# Patient Record
Sex: FEMALE | Race: WHITE | NOT HISPANIC OR LATINO | Employment: OTHER | URBAN - NONMETROPOLITAN AREA
[De-identification: names, ages, dates, MRNs, and addresses within clinical notes are randomized per-mention and may not be internally consistent; named-entity substitution may affect disease eponyms.]

---

## 2017-05-09 ENCOUNTER — HOSPITAL ENCOUNTER (OUTPATIENT)
Dept: PULMONOLOGY | Facility: HOSPITAL | Age: 62
Discharge: HOME | End: 2017-05-09

## 2017-05-09 DIAGNOSIS — J44.9 CHRONIC OBSTRUCTIVE PULMONARY DISEASE, UNSPECIFIED COPD TYPE (HCC): Primary | ICD-10-CM

## 2017-05-11 ENCOUNTER — HOSPITAL ENCOUNTER (OUTPATIENT)
Dept: PULMONOLOGY | Facility: HOSPITAL | Age: 62
Discharge: HOME | End: 2017-05-11
Admitting: INTERNAL MEDICINE

## 2017-05-11 VITALS — DIASTOLIC BLOOD PRESSURE: 47 MMHG | SYSTOLIC BLOOD PRESSURE: 106 MMHG | OXYGEN SATURATION: 88 % | HEART RATE: 91 BPM

## 2017-05-11 DIAGNOSIS — J44.9 CHRONIC OBSTRUCTIVE PULMONARY DISEASE, UNSPECIFIED COPD TYPE (HCC): Primary | ICD-10-CM

## 2017-05-11 PROCEDURE — G0238 OTH RESP PROC, INDIV: HCPCS

## 2017-05-11 RX ORDER — FLUTICASONE PROPIONATE 50 MCG
2 SPRAY, SUSPENSION (ML) NASAL DAILY
COMMUNITY

## 2017-05-11 RX ORDER — CLOPIDOGREL BISULFATE 75 MG/1
75 TABLET ORAL DAILY
COMMUNITY

## 2017-05-11 RX ORDER — LOSARTAN POTASSIUM 25 MG/1
25 TABLET ORAL DAILY
COMMUNITY
End: 2017-06-30 | Stop reason: SDUPTHER

## 2017-05-11 RX ORDER — POTASSIUM CHLORIDE 20 MEQ/1
20 TABLET, EXTENDED RELEASE ORAL DAILY
COMMUNITY

## 2017-05-11 RX ORDER — SPIRONOLACTONE 25 MG/1
25 TABLET ORAL DAILY
COMMUNITY

## 2017-05-16 ENCOUNTER — TRANSCRIBE ORDERS (OUTPATIENT)
Dept: RESPIRATORY THERAPY | Facility: HOSPITAL | Age: 62
End: 2017-05-16

## 2017-05-16 DIAGNOSIS — J44.9 CHRONIC OBSTRUCTIVE PULMONARY DISEASE, UNSPECIFIED COPD TYPE (HCC): Primary | ICD-10-CM

## 2017-05-18 ENCOUNTER — HOSPITAL ENCOUNTER (OUTPATIENT)
Dept: PULMONOLOGY | Facility: HOSPITAL | Age: 62
Discharge: HOME | End: 2017-05-18
Admitting: INTERNAL MEDICINE

## 2017-05-18 VITALS — SYSTOLIC BLOOD PRESSURE: 152 MMHG | OXYGEN SATURATION: 90 % | DIASTOLIC BLOOD PRESSURE: 80 MMHG | HEART RATE: 96 BPM

## 2017-05-18 DIAGNOSIS — J44.9 CHRONIC OBSTRUCTIVE PULMONARY DISEASE, UNSPECIFIED COPD TYPE (HCC): Primary | ICD-10-CM

## 2017-05-18 PROCEDURE — G0238 OTH RESP PROC, INDIV: HCPCS

## 2017-06-27 ENCOUNTER — HOSPITAL ENCOUNTER (OUTPATIENT)
Dept: PULMONOLOGY | Facility: HOSPITAL | Age: 62
Discharge: HOME | End: 2017-06-27
Admitting: INTERNAL MEDICINE

## 2017-06-27 VITALS — OXYGEN SATURATION: 86 % | HEART RATE: 95 BPM | DIASTOLIC BLOOD PRESSURE: 62 MMHG | SYSTOLIC BLOOD PRESSURE: 137 MMHG

## 2017-06-27 DIAGNOSIS — J44.9 CHRONIC OBSTRUCTIVE PULMONARY DISEASE, UNSPECIFIED COPD TYPE (HCC): Primary | ICD-10-CM

## 2017-06-27 PROCEDURE — G0238 OTH RESP PROC, INDIV: HCPCS

## 2017-06-30 RX ORDER — PRAVASTATIN SODIUM 40 MG
TABLET ORAL
Qty: 90 TABLET | Refills: 0 | OUTPATIENT
Start: 2017-06-30

## 2017-06-30 RX ORDER — LOSARTAN POTASSIUM 25 MG/1
TABLET ORAL
Qty: 90 TABLET | Refills: 0 | Status: SHIPPED | OUTPATIENT
Start: 2017-06-30 | End: 2017-10-23 | Stop reason: SDUPTHER

## 2017-07-03 ENCOUNTER — LAB (OUTPATIENT)
Dept: LAB | Facility: HOSPITAL | Age: 62
End: 2017-07-03

## 2017-07-03 DIAGNOSIS — I10 ESSENTIAL HYPERTENSION: ICD-10-CM

## 2017-07-03 DIAGNOSIS — E11.42 TYPE 2 DIABETES MELLITUS WITH DIABETIC POLYNEUROPATHY, WITH LONG-TERM CURRENT USE OF INSULIN (HCC): Primary | ICD-10-CM

## 2017-07-03 DIAGNOSIS — E11.42 DIABETIC POLYNEUROPATHY ASSOCIATED WITH TYPE 2 DIABETES MELLITUS (HCC): ICD-10-CM

## 2017-07-03 DIAGNOSIS — Z79.4 TYPE 2 DIABETES MELLITUS WITH DIABETIC POLYNEUROPATHY, WITH LONG-TERM CURRENT USE OF INSULIN (HCC): Primary | ICD-10-CM

## 2017-07-03 DIAGNOSIS — E78.5 HYPERLIPIDEMIA, UNSPECIFIED HYPERLIPIDEMIA TYPE: ICD-10-CM

## 2017-07-03 LAB
ALBUMIN SERPL-MCNC: 3.9 G/DL (ref 3.4–4.8)
ALBUMIN/GLOB SERPL: 0.9 G/DL (ref 1.1–1.8)
ALP SERPL-CCNC: 103 U/L (ref 38–126)
ALT SERPL W P-5'-P-CCNC: 20 U/L (ref 9–52)
ANION GAP SERPL CALCULATED.3IONS-SCNC: 13 MMOL/L (ref 5–15)
AST SERPL-CCNC: 69 U/L (ref 14–36)
BILIRUB SERPL-MCNC: 0.8 MG/DL (ref 0.2–1.3)
BUN BLD-MCNC: 14 MG/DL (ref 7–21)
BUN/CREAT SERPL: 17.5 (ref 7–25)
CALCIUM SPEC-SCNC: 8.9 MG/DL (ref 8.4–10.2)
CHLORIDE SERPL-SCNC: 102 MMOL/L (ref 95–110)
CO2 SERPL-SCNC: 23 MMOL/L (ref 22–31)
CREAT BLD-MCNC: 0.8 MG/DL (ref 0.5–1)
GFR SERPL CREATININE-BSD FRML MDRD: 73 ML/MIN/1.73 (ref 45–104)
GLOBULIN UR ELPH-MCNC: 4.2 GM/DL (ref 2.3–3.5)
GLUCOSE BLD-MCNC: 40 MG/DL (ref 60–100)
POTASSIUM BLD-SCNC: 3.2 MMOL/L (ref 3.5–5.1)
PROT SERPL-MCNC: 8.1 G/DL (ref 6.3–8.6)
SODIUM BLD-SCNC: 138 MMOL/L (ref 137–145)

## 2017-07-03 PROCEDURE — 82607 VITAMIN B-12: CPT | Performed by: INTERNAL MEDICINE

## 2017-07-03 PROCEDURE — 80053 COMPREHEN METABOLIC PANEL: CPT | Performed by: INTERNAL MEDICINE

## 2017-07-03 PROCEDURE — 80061 LIPID PANEL: CPT | Performed by: INTERNAL MEDICINE

## 2017-07-03 PROCEDURE — 36415 COLL VENOUS BLD VENIPUNCTURE: CPT | Performed by: INTERNAL MEDICINE

## 2017-07-03 PROCEDURE — 84443 ASSAY THYROID STIM HORMONE: CPT | Performed by: INTERNAL MEDICINE

## 2017-07-03 PROCEDURE — 85025 COMPLETE CBC W/AUTO DIFF WBC: CPT | Performed by: INTERNAL MEDICINE

## 2017-07-03 PROCEDURE — 82306 VITAMIN D 25 HYDROXY: CPT | Performed by: INTERNAL MEDICINE

## 2017-07-03 PROCEDURE — 82570 ASSAY OF URINE CREATININE: CPT | Performed by: INTERNAL MEDICINE

## 2017-07-03 PROCEDURE — 83036 HEMOGLOBIN GLYCOSYLATED A1C: CPT | Performed by: INTERNAL MEDICINE

## 2017-07-03 PROCEDURE — 84156 ASSAY OF PROTEIN URINE: CPT | Performed by: INTERNAL MEDICINE

## 2017-07-03 PROCEDURE — 82043 UR ALBUMIN QUANTITATIVE: CPT | Performed by: INTERNAL MEDICINE

## 2017-07-06 RX ORDER — PRAVASTATIN SODIUM 40 MG
40 TABLET ORAL DAILY
Qty: 30 TABLET | Refills: 0 | Status: SHIPPED | OUTPATIENT
Start: 2017-07-06 | End: 2017-10-23 | Stop reason: SDUPTHER

## 2017-07-11 ENCOUNTER — HOSPITAL ENCOUNTER (OUTPATIENT)
Dept: PULMONOLOGY | Facility: HOSPITAL | Age: 62
Discharge: HOME | End: 2017-07-11
Admitting: INTERNAL MEDICINE

## 2017-07-11 VITALS — DIASTOLIC BLOOD PRESSURE: 46 MMHG | HEART RATE: 98 BPM | SYSTOLIC BLOOD PRESSURE: 96 MMHG | OXYGEN SATURATION: 90 %

## 2017-07-11 DIAGNOSIS — J44.9 CHRONIC OBSTRUCTIVE PULMONARY DISEASE, UNSPECIFIED COPD TYPE (HCC): Primary | ICD-10-CM

## 2017-07-11 PROCEDURE — G0238 OTH RESP PROC, INDIV: HCPCS

## 2017-07-27 ENCOUNTER — APPOINTMENT (OUTPATIENT)
Dept: PULMONOLOGY | Facility: HOSPITAL | Age: 62
End: 2017-07-27

## 2017-09-27 DIAGNOSIS — E55.9 VITAMIN D DEFICIENCY: ICD-10-CM

## 2017-09-27 DIAGNOSIS — E11.42 TYPE 2 DIABETES MELLITUS WITH DIABETIC POLYNEUROPATHY, WITH LONG-TERM CURRENT USE OF INSULIN (HCC): Primary | ICD-10-CM

## 2017-09-27 DIAGNOSIS — Z79.4 TYPE 2 DIABETES MELLITUS WITH DIABETIC POLYNEUROPATHY, WITH LONG-TERM CURRENT USE OF INSULIN (HCC): Primary | ICD-10-CM

## 2017-09-29 ENCOUNTER — APPOINTMENT (OUTPATIENT)
Dept: LAB | Facility: HOSPITAL | Age: 62
End: 2017-09-29

## 2017-09-29 LAB
25(OH)D3 SERPL-MCNC: 51.3 NG/ML (ref 30–100)
ALBUMIN SERPL-MCNC: 3.9 G/DL (ref 3.4–4.8)
ALBUMIN/GLOB SERPL: 1 G/DL (ref 1.1–1.8)
ALP SERPL-CCNC: 92 U/L (ref 38–126)
ALT SERPL W P-5'-P-CCNC: 27 U/L (ref 9–52)
ANION GAP SERPL CALCULATED.3IONS-SCNC: 10 MMOL/L (ref 5–15)
ARTICHOKE IGE QN: 114 MG/DL (ref 1–129)
AST SERPL-CCNC: 41 U/L (ref 14–36)
BASOPHILS # BLD AUTO: 0.04 10*3/MM3 (ref 0–0.2)
BASOPHILS NFR BLD AUTO: 0.4 % (ref 0–2)
BILIRUB SERPL-MCNC: 0.6 MG/DL (ref 0.2–1.3)
BUN BLD-MCNC: 14 MG/DL (ref 7–21)
BUN/CREAT SERPL: 18.4 (ref 7–25)
CALCIUM SPEC-SCNC: 8.9 MG/DL (ref 8.4–10.2)
CHLORIDE SERPL-SCNC: 93 MMOL/L (ref 95–110)
CHOLEST SERPL-MCNC: 194 MG/DL (ref 0–199)
CO2 SERPL-SCNC: 36 MMOL/L (ref 22–31)
CREAT BLD-MCNC: 0.76 MG/DL (ref 0.5–1)
DEPRECATED RDW RBC AUTO: 43.3 FL (ref 36.4–46.3)
EOSINOPHIL # BLD AUTO: 0.16 10*3/MM3 (ref 0–0.7)
EOSINOPHIL NFR BLD AUTO: 1.4 % (ref 0–7)
ERYTHROCYTE [DISTWIDTH] IN BLOOD BY AUTOMATED COUNT: 13 % (ref 11.5–14.5)
GFR SERPL CREATININE-BSD FRML MDRD: 77 ML/MIN/1.73 (ref 45–104)
GLOBULIN UR ELPH-MCNC: 4 GM/DL (ref 2.3–3.5)
GLUCOSE BLD-MCNC: 157 MG/DL (ref 60–100)
HBA1C MFR BLD: 8.3 % (ref 4–5.6)
HCT VFR BLD AUTO: 39.8 % (ref 35–45)
HDLC SERPL-MCNC: 31 MG/DL (ref 60–200)
HGB BLD-MCNC: 13.7 G/DL (ref 12–15.5)
IMM GRANULOCYTES # BLD: 0.04 10*3/MM3 (ref 0–0.02)
IMM GRANULOCYTES NFR BLD: 0.4 % (ref 0–0.5)
LDLC/HDLC SERPL: 3.66 {RATIO} (ref 0–3.22)
LYMPHOCYTES # BLD AUTO: 2.82 10*3/MM3 (ref 0.6–4.2)
LYMPHOCYTES NFR BLD AUTO: 25.4 % (ref 10–50)
MCH RBC QN AUTO: 31.4 PG (ref 26.5–34)
MCHC RBC AUTO-ENTMCNC: 34.4 G/DL (ref 31.4–36)
MCV RBC AUTO: 91.1 FL (ref 80–98)
MONOCYTES # BLD AUTO: 0.5 10*3/MM3 (ref 0–0.9)
MONOCYTES NFR BLD AUTO: 4.5 % (ref 0–12)
NEUTROPHILS # BLD AUTO: 7.54 10*3/MM3 (ref 2–8.6)
NEUTROPHILS NFR BLD AUTO: 67.9 % (ref 37–80)
PLATELET # BLD AUTO: 269 10*3/MM3 (ref 150–450)
PMV BLD AUTO: 10.4 FL (ref 8–12)
POTASSIUM BLD-SCNC: 3.3 MMOL/L (ref 3.5–5.1)
PROT SERPL-MCNC: 7.9 G/DL (ref 6.3–8.6)
RBC # BLD AUTO: 4.37 10*6/MM3 (ref 3.77–5.16)
SODIUM BLD-SCNC: 139 MMOL/L (ref 137–145)
TRIGL SERPL-MCNC: 247 MG/DL (ref 20–199)
TSH SERPL DL<=0.05 MIU/L-ACNC: 1.88 MIU/ML (ref 0.46–4.68)
VIT B12 BLD-MCNC: 750 PG/ML (ref 239–931)
WBC NRBC COR # BLD: 11.1 10*3/MM3 (ref 3.2–9.8)

## 2017-09-29 PROCEDURE — 82607 VITAMIN B-12: CPT | Performed by: INTERNAL MEDICINE

## 2017-09-29 PROCEDURE — 80053 COMPREHEN METABOLIC PANEL: CPT | Performed by: INTERNAL MEDICINE

## 2017-09-29 PROCEDURE — 36415 COLL VENOUS BLD VENIPUNCTURE: CPT | Performed by: INTERNAL MEDICINE

## 2017-09-29 PROCEDURE — 83036 HEMOGLOBIN GLYCOSYLATED A1C: CPT | Performed by: INTERNAL MEDICINE

## 2017-09-29 PROCEDURE — 85025 COMPLETE CBC W/AUTO DIFF WBC: CPT | Performed by: INTERNAL MEDICINE

## 2017-09-29 PROCEDURE — 84443 ASSAY THYROID STIM HORMONE: CPT | Performed by: INTERNAL MEDICINE

## 2017-09-29 PROCEDURE — 80061 LIPID PANEL: CPT | Performed by: INTERNAL MEDICINE

## 2017-09-29 PROCEDURE — 82306 VITAMIN D 25 HYDROXY: CPT | Performed by: INTERNAL MEDICINE

## 2017-10-02 ENCOUNTER — HOSPITAL ENCOUNTER (OUTPATIENT)
Dept: PULMONOLOGY | Facility: HOSPITAL | Age: 62
Discharge: HOME | End: 2017-10-02
Attending: INTERNAL MEDICINE | Admitting: INTERNAL MEDICINE

## 2017-10-02 DIAGNOSIS — J44.9 CHRONIC OBSTRUCTIVE PULMONARY DISEASE, UNSPECIFIED COPD TYPE (HCC): ICD-10-CM

## 2017-10-02 PROCEDURE — 94010 BREATHING CAPACITY TEST: CPT | Performed by: INTERNAL MEDICINE

## 2017-10-02 PROCEDURE — 94010 BREATHING CAPACITY TEST: CPT

## 2017-10-04 ENCOUNTER — OFFICE VISIT (OUTPATIENT)
Dept: ENDOCRINOLOGY | Facility: CLINIC | Age: 62
End: 2017-10-04

## 2017-10-04 VITALS
HEART RATE: 96 BPM | BODY MASS INDEX: 35.59 KG/M2 | WEIGHT: 188.5 LBS | HEIGHT: 61 IN | DIASTOLIC BLOOD PRESSURE: 80 MMHG | SYSTOLIC BLOOD PRESSURE: 124 MMHG

## 2017-10-04 DIAGNOSIS — E55.9 VITAMIN D DEFICIENCY: ICD-10-CM

## 2017-10-04 DIAGNOSIS — E78.5 HYPERLIPIDEMIA, UNSPECIFIED HYPERLIPIDEMIA TYPE: ICD-10-CM

## 2017-10-04 DIAGNOSIS — E11.42 TYPE 2 DIABETES MELLITUS WITH DIABETIC POLYNEUROPATHY, WITH LONG-TERM CURRENT USE OF INSULIN (HCC): Primary | ICD-10-CM

## 2017-10-04 DIAGNOSIS — Z79.4 TYPE 2 DIABETES MELLITUS WITH DIABETIC POLYNEUROPATHY, WITH LONG-TERM CURRENT USE OF INSULIN (HCC): Primary | ICD-10-CM

## 2017-10-04 DIAGNOSIS — I10 ESSENTIAL HYPERTENSION: ICD-10-CM

## 2017-10-04 LAB
GLUCOSE BLDC GLUCOMTR-MCNC: 49 MG/DL (ref 70–130)
GLUCOSE BLDC GLUCOMTR-MCNC: 94 MG/DL (ref 70–130)

## 2017-10-04 PROCEDURE — 99214 OFFICE O/P EST MOD 30 MIN: CPT | Performed by: NURSE PRACTITIONER

## 2017-10-04 PROCEDURE — 82962 GLUCOSE BLOOD TEST: CPT | Performed by: NURSE PRACTITIONER

## 2017-10-04 NOTE — PROGRESS NOTES
Subjective    Sulma Jacques is a 61 y.o. female. she is here today for follow-up.    History of Present Illness       History of Present Illness  Duration/Timing:  Diabetes mellitus type 2  duration, since 1998     timing, constant     quality, uncontrolled     -----------------------     Recent admission to hospital for CHF   Discharged one week ago   States blood sugar was controlled while in hospital because she could not drink regular pepsi  Severity (Complications/Hospitalizations)  Secondary Macrovascular Complications:  No CAD, No CVA, No PAD  Carotid Artery Disease  Secondary Microvascular Complications:  No Diabetic Nephropathy, No proteinuria, No Diabetic Retinopathy, Diabetic Neuropathy     Context  Diabetes Regimen:  Insulin, Oral Medications, Last HgbA1c% 7. 9 from May 2016    Lab Results   Component Value Date    HGBA1C 8.3 (H) 09/29/2017       Blood Glucose Readings      Low in office of 49     Refers not checking  Exercise:  Does not exercise     Associated Signs/Symptoms  Hyperglycemic Symptoms:  Polyuria, Polydipsia, Polyphagia  Hypoglycemic Episodes:  Documented symptomatic hypoglycemia  Hypertriglyceridemia     June 2015 Tg 647     Tot Chol 181     HDL 26         The following portions of the patient's history were reviewed and updated as appropriate:   Past Medical History:   Diagnosis Date   • Acute gastritis    • Anxiety    • Atherosclerosis     arteries of the extremities with intermittent claudication   • Carotid artery stenosis    • Chronic hypoxemic respiratory failure     On oxygen supplementation @2L/mi n q 24 hours   • Chronic low back pain    • COPD (chronic obstructive pulmonary disease)     Mod-Severe   • Depression    • Diabetes mellitus     Type 2   • Dyspnea    • Edema    • Encephalitis    • Epigastric pain    • Esophagitis    • Gallstone ileus     status post laparoscopic cholecystectomy   • GERD (gastroesophageal reflux disease)    • Hx of colonic polyp    • Hyperlipidemia     • Hypertension    • Hypoxia     nocturnal and exertional on oxygen supplementation   • Insomnia    • Left upper quadrant pain    • Malaise and fatigue    • Migraine    • Neurogenic claudication    • Neuropathy    • Nicotine dependence    • Obstructive sleep apnea of adult     Mild RUBEN and moderate REM sleep associated RUBEN   • Postoperative visit     s/p L CEA 1/15/2015   • Primary fibromyalgia syndrome    • Restless legs    • Urinary system disease    • UTI (urinary tract infection)    • Vulvovaginitis      Past Surgical History:   Procedure Laterality Date   • CARDIAC CATHETERIZATION      Proximal circumflex artery towards the ostium with up to 60% stenosis diffusely calcified right coronary artery with 30-40% stenosis. Preserved LV systolic function with Ef of 55%   • CAROTID ENDARTERECTOMY      Left common and internal carotid endarterectomy with Hemashield patch angioplasty   •  SECTION      x2   • CHOLECYSTECTOMY      Laparoscopic cholecystectomy with operative cholangiogram. Gallstones   • HERNIA REPAIR      Multiple   • HYSTERECTOMY     • TRANSESOPHAGEAL ECHOCARDIOGRAM (AMANDA)      With color flow- Mild left atrial enlargemtn with mild CLVH. Evidence of mild septal wall hypokinesis. Decreased LV systolic function. Ef of 40-45%. MV intact. Tricuspid intact. Mild to moderate mitral regurg. mildtricuspid regurg, mild aortic insuff   • TUBAL ABDOMINAL LIGATION       Family History   Problem Relation Age of Onset   • Cancer Other    • Diabetes Other    • Thyroid disease Other      OB History     No data available        Current Outpatient Prescriptions   Medication Sig Dispense Refill   • albuterol (PROVENTIL HFA;VENTOLIN HFA) 108 (90 BASE) MCG/ACT inhaler Inhale 2 puffs every 4 (four) hours as needed for wheezing.     • albuterol (PROVENTIL) (2.5 MG/3ML) 0.083% nebulizer solution      • amitriptyline (ELAVIL) 25 MG tablet Take 25 mg by mouth every night.     • aspirin 325 MG tablet Take 325 mg by mouth  every night.     • B Complex-C-Folic Acid (NEPHROCAPS PO) Take 1 tablet by mouth daily.     • carvedilol (COREG) 6.25 MG tablet Take 3.125 mg by mouth 2 (two) times a day with meals.     • cetirizine (ZyrTEC) 10 MG tablet Take 10 mg by mouth daily.     • Cholecalciferol (VITAMIN D PO) Take 1 tablet by mouth daily.     • clopidogrel (PLAVIX) 75 MG tablet Take 75 mg by mouth Daily.     • Cranberry 450 MG tablet Take 1 tablet by mouth daily.     • dexlansoprazole (DEXILANT) 60 MG capsule Take 60 mg by mouth daily.     • docusate sodium (COLACE) 50 MG capsule Take 100 mg by mouth 2 (two) times a day.     • DULoxetine (CYMBALTA) 60 MG capsule Take 60 mg by mouth 2 (two) times a day.     • ferrous sulfate 325 (65 FE) MG tablet Take 325 mg by mouth 3 (three) times a day with meals.     • fluticasone (FLONASE) 50 MCG/ACT nasal spray 2 sprays into each nostril Daily.     • furosemide (LASIX) 40 MG tablet Take 40 mg by mouth 2 (two) times a day.     • Glucose Blood (BLOOD GLUCOSE TEST) strip Use 4 x daily, use any brand covered by insurance or same brand as before 360 each 3   • Insulin Glargine (TOUJEO SOLOSTAR) 300 UNIT/ML solution pen-injector Inject 75 Units under the skin Every Night. 15 pen 3   • Insulin Pen Needle (B-D UF III MINI PEN NEEDLES) 31G X 5 MM misc Use 4 times daily 360 each 3   • KRILL OIL OMEGA-3 PO Take 1 capsule by mouth daily.     • losartan (COZAAR) 25 MG tablet TAKE ONE TABLET BY MOUTH ONCE DAILY 90 tablet 0   • lysine acetate 500 MG tablet tablet Take 1 tablet by mouth every other day.     • magnesium oxide (MAGOX) 400 (241.3 MG) MG tablet tablet Take 1 tablet by mouth 2 (two) times a day. 60 tablet 2   • Multiple Vitamins-Minerals (MULTIVITAMIN ADULT PO) Take 1 tablet by mouth daily.     • niacin 500 MG tablet Take 500 mg by mouth 2 (two) times a day with meals. Wants caps     • nitroglycerin (NITROSTAT) 0.4 MG SL tablet      • NOVOLOG FLEXPEN 100 UNIT/ML solution pen-injector sc pen 25 units with  meals 24 pen 3   • polycarbophil (FIBERCON) 625 MG tablet Take 1,250 mg by mouth daily.     • potassium chloride (K-DUR,KLOR-CON) 20 MEQ CR tablet Take 20 mEq by mouth Daily.     • pravastatin (PRAVACHOL) 40 MG tablet Take 1 tablet by mouth Daily. 30 tablet 0   • pregabalin (LYRICA) 150 MG capsule Take 150 mg by mouth 2 (two) times a day.     • rOPINIRole (REQUIP) 3 MG tablet Take 3 mg by mouth every night.     • spironolactone (ALDACTONE) 25 MG tablet Take 25 mg by mouth Daily.     • Umeclidinium-Vilanterol (ANORO ELLIPTA) 62.5-25 MCG/INH aerosol powder  Inhale 1 puff daily.       No current facility-administered medications for this visit.      Allergies   Allergen Reactions   • Codeine Other (See Comments)     N/V   • Crestor [Rosuvastatin Calcium] Other (See Comments)     Muscle weakness   • Demerol [Meperidine] Other (See Comments)     Dorado   • Fish Oil Other (See Comments)     N/V   • Lipitor [Atorvastatin] Other (See Comments)     Flu like symptoms   • Lovaza [Omega-3-Acid Ethyl Esters] Swelling   • Metformin And Related Other (See Comments)     N/V   • Nateglinide Other (See Comments)     N/V   • Other Other (See Comments)     Staples, skin irritation   • Simvastatin Other (See Comments)     Flu like symptoms   • Sulfa Antibiotics Other (See Comments)     N/V   • Tricor [Fenofibrate] Other (See Comments)     N/V   • Wellbutrin [Bupropion] Other (See Comments)     N/V   • Betadine [Povidone Iodine] Rash     Social History     Social History   • Marital status:      Spouse name: N/A   • Number of children: N/A   • Years of education: N/A     Social History Main Topics   • Smoking status: Current Every Day Smoker   • Smokeless tobacco: None   • Alcohol use No   • Drug use: None   • Sexual activity: Not Asked     Other Topics Concern   • None     Social History Narrative       Review of Systems  Review of Systems   Constitutional: Negative for activity change, appetite change, diaphoresis and fatigue.  "  HENT: Negative for congestion, dental problem, drooling, facial swelling, sneezing, sore throat, tinnitus, trouble swallowing and voice change.    Eyes: Negative for photophobia, pain, discharge, redness, itching and visual disturbance.   Respiratory: Negative for apnea, cough, choking, chest tightness and shortness of breath.    Cardiovascular: Negative for chest pain, palpitations and leg swelling.   Gastrointestinal: Negative for abdominal distention, abdominal pain, constipation, diarrhea, nausea and vomiting.   Endocrine: Negative for cold intolerance, heat intolerance, polydipsia, polyphagia and polyuria.   Genitourinary: Negative for difficulty urinating, dysuria, frequency, hematuria and urgency.   Musculoskeletal: Negative for arthralgias, back pain, gait problem, joint swelling, myalgias, neck pain and neck stiffness.   Skin: Negative for color change, pallor, rash and wound.   Allergic/Immunologic: Negative for environmental allergies, food allergies and immunocompromised state.   Neurological: Negative for dizziness, tremors, facial asymmetry, weakness, light-headedness, numbness and headaches.   Hematological: Negative for adenopathy. Does not bruise/bleed easily.   Psychiatric/Behavioral: Negative for agitation, behavioral problems, confusion and sleep disturbance.        Objective    /80 (BP Location: Left arm, Patient Position: Sitting, Cuff Size: Adult)  Pulse 96  Ht 61\" (154.9 cm)  Wt 188 lb 8 oz (85.5 kg)  BMI 35.62 kg/m2  Physical Exam   Constitutional: She is oriented to person, place, and time. She appears well-developed and well-nourished. No distress.   HENT:   Head: Normocephalic and atraumatic.   Right Ear: External ear normal.   Left Ear: External ear normal.   Nose: Nose normal.   Eyes: Conjunctivae and EOM are normal. Pupils are equal, round, and reactive to light.   Neck: Normal range of motion. Neck supple. No tracheal deviation present. No thyromegaly present. "   Cardiovascular: Normal rate, regular rhythm and normal heart sounds.    No murmur heard.  Pulmonary/Chest: Effort normal and breath sounds normal. No respiratory distress. She has no wheezes.   Abdominal: Soft. Bowel sounds are normal. There is no tenderness. There is no rebound and no guarding.   Musculoskeletal: Normal range of motion. She exhibits no edema, tenderness or deformity.      During the foot exam she had a monofilament test performed (decreased sensation bilateral foot ).    Vascular Status -  Her exam exhibits right foot edema. Her exam exhibits left foot edema.   Skin Integrity  -  She has right foot onychomycosis, callous right foot and right heel is dry and cracked.She has left foot onychomycosis, callous left foot and left heel dry and cracked..  Neurological: She is alert and oriented to person, place, and time. No cranial nerve deficit.   Skin: Skin is warm and dry. No rash noted.   Psychiatric: She has a normal mood and affect. Her behavior is normal. Judgment and thought content normal.       Lab Review  Glucose   Date Value   09/29/2017 157 mg/dL (H)   07/03/2017 40 mg/dL (C)   10/30/2016 173 mg/dl (H)   10/29/2016 79 mg/dl   10/28/2016 146 mg/dl (H)     Sodium (mmol/L)   Date Value   09/29/2017 139   07/03/2017 138   10/30/2016 136 (L)   10/29/2016 138   10/28/2016 136 (L)     Potassium (mmol/L)   Date Value   09/29/2017 3.3 (L)   07/03/2017 3.2 (L)   10/30/2016 3.7   10/29/2016 3.4 (L)   10/28/2016 3.9     Chloride (mmol/L)   Date Value   09/29/2017 93 (L)   07/03/2017 102   10/30/2016 92 (L)   10/29/2016 95   10/28/2016 99     CO2 (mmol/L)   Date Value   09/29/2017 36.0 (H)   07/03/2017 23.0   10/30/2016 38 (H)   10/29/2016 38 (H)   10/28/2016 32 (H)     BUN   Date Value   09/29/2017 14 mg/dL   07/03/2017 14 mg/dL   10/30/2016 12 mg/dl   10/29/2016 10 mg/dl   10/28/2016 7 mg/dl     Creatinine   Date Value   09/29/2017 0.76 mg/dL   07/03/2017 0.80 mg/dL   10/30/2016 0.8 mg/dl   10/29/2016  0.8 mg/dl   10/28/2016 0.7 mg/dl     Hemoglobin A1C   Date Value   09/29/2017 8.3 % (H)   07/03/2017 8.39 % (H)   10/26/2016 7.2 %TotHgb (H)   05/03/2016 7.9 %TotHgb (H)   02/01/2016 7.3 %TotHgb (H)     Triglycerides   Date Value   09/29/2017 247 mg/dL (H)   07/03/2017 183 mg/dL   10/26/2016 143 mg/dl   05/03/2016 160 mg/dl   02/01/2016 400 mg/dl (H)       Assessment/Plan      1. Type 2 diabetes mellitus with diabetic polyneuropathy, with long-term current use of insulin    2. Hyperlipidemia, unspecified hyperlipidemia type    3. Essential hypertension    4. Vitamin D deficiency     .    Medications prescribed:  Outpatient Encounter Prescriptions as of 10/4/2017   Medication Sig Dispense Refill   • albuterol (PROVENTIL HFA;VENTOLIN HFA) 108 (90 BASE) MCG/ACT inhaler Inhale 2 puffs every 4 (four) hours as needed for wheezing.     • albuterol (PROVENTIL) (2.5 MG/3ML) 0.083% nebulizer solution      • amitriptyline (ELAVIL) 25 MG tablet Take 25 mg by mouth every night.     • aspirin 325 MG tablet Take 325 mg by mouth every night.     • B Complex-C-Folic Acid (NEPHROCAPS PO) Take 1 tablet by mouth daily.     • carvedilol (COREG) 6.25 MG tablet Take 3.125 mg by mouth 2 (two) times a day with meals.     • cetirizine (ZyrTEC) 10 MG tablet Take 10 mg by mouth daily.     • Cholecalciferol (VITAMIN D PO) Take 1 tablet by mouth daily.     • clopidogrel (PLAVIX) 75 MG tablet Take 75 mg by mouth Daily.     • Cranberry 450 MG tablet Take 1 tablet by mouth daily.     • dexlansoprazole (DEXILANT) 60 MG capsule Take 60 mg by mouth daily.     • docusate sodium (COLACE) 50 MG capsule Take 100 mg by mouth 2 (two) times a day.     • DULoxetine (CYMBALTA) 60 MG capsule Take 60 mg by mouth 2 (two) times a day.     • ferrous sulfate 325 (65 FE) MG tablet Take 325 mg by mouth 3 (three) times a day with meals.     • fluticasone (FLONASE) 50 MCG/ACT nasal spray 2 sprays into each nostril Daily.     • furosemide (LASIX) 40 MG tablet Take 40 mg  by mouth 2 (two) times a day.     • Glucose Blood (BLOOD GLUCOSE TEST) strip Use 4 x daily, use any brand covered by insurance or same brand as before 360 each 3   • Insulin Glargine (TOUJEO SOLOSTAR) 300 UNIT/ML solution pen-injector Inject 75 Units under the skin Every Night. 15 pen 3   • Insulin Pen Needle (B-D UF III MINI PEN NEEDLES) 31G X 5 MM misc Use 4 times daily 360 each 3   • KRILL OIL OMEGA-3 PO Take 1 capsule by mouth daily.     • losartan (COZAAR) 25 MG tablet TAKE ONE TABLET BY MOUTH ONCE DAILY 90 tablet 0   • lysine acetate 500 MG tablet tablet Take 1 tablet by mouth every other day.     • magnesium oxide (MAGOX) 400 (241.3 MG) MG tablet tablet Take 1 tablet by mouth 2 (two) times a day. 60 tablet 2   • Multiple Vitamins-Minerals (MULTIVITAMIN ADULT PO) Take 1 tablet by mouth daily.     • niacin 500 MG tablet Take 500 mg by mouth 2 (two) times a day with meals. Wants caps     • nitroglycerin (NITROSTAT) 0.4 MG SL tablet      • NOVOLOG FLEXPEN 100 UNIT/ML solution pen-injector sc pen 25 units with meals 24 pen 3   • polycarbophil (FIBERCON) 625 MG tablet Take 1,250 mg by mouth daily.     • potassium chloride (K-DUR,KLOR-CON) 20 MEQ CR tablet Take 20 mEq by mouth Daily.     • pravastatin (PRAVACHOL) 40 MG tablet Take 1 tablet by mouth Daily. 30 tablet 0   • pregabalin (LYRICA) 150 MG capsule Take 150 mg by mouth 2 (two) times a day.     • rOPINIRole (REQUIP) 3 MG tablet Take 3 mg by mouth every night.     • spironolactone (ALDACTONE) 25 MG tablet Take 25 mg by mouth Daily.     • Umeclidinium-Vilanterol (ANORO ELLIPTA) 62.5-25 MCG/INH aerosol powder  Inhale 1 puff daily.       No facility-administered encounter medications on file as of 10/4/2017.        Orders placed during this encounter include:  Orders Placed This Encounter   Procedures   • Iron Profile     Standing Status:   Future   • Comprehensive Metabolic Panel     Standing Status:   Future   • Hemoglobin A1c     Standing Status:   Future   •  Lipid Panel     Standing Status:   Future   • Vitamin D 25 Hydroxy     Standing Status:   Future   • Protein / Creatinine Ratio, Urine - Urine, Clean Catch     Standing Status:   Future   • Microalbumin / Creatinine Urine Ratio - Urine, Clean Catch     Standing Status:   Future   • POC Glucose Fingerstick   • POC Glucose Fingerstick   • CBC & Differential     Standing Status:   Future     Order Specific Question:   Manual Differential     Answer:   No       Glycemic management     Lab Results   Component Value Date    HGBA1C 8.3 (H) 09/29/2017        toujeo 56 units --- 70 units --- decrease to 68 units      compare your bedtime reading to your morning reading     if you go to bed between 80 to 150 , you should wake between 80 to 150      if dropping below 80 or dropping more than 50 points overnight - back off 5 units     if rising more than 50 points overnight - increase by 3 units every 3 days     =================     Januvia 100 mg daily, too expensive     =================     Novolog  1 units for every 15 grams of carbohydrate --increase to 2 units per 15 grams of CHO           Lipid Management      Total Cholesterol   Date Value Ref Range Status   09/29/2017 194 0 - 199 mg/dL Final     Triglycerides   Date Value Ref Range Status   09/29/2017 247 (H) 20 - 199 mg/dL Final     HDL Cholesterol   Date Value Ref Range Status   09/29/2017 31 (L) 60 - 200 mg/dL Final     LDL Cholesterol    Date Value Ref Range Status   09/29/2017 114 1 - 129 mg/dL Final             pravastatin at night-- not taking restart      niacin 500 mg , preceded by aspirin - not taking restart      high fructose corn syrup drinks raise triglycerides and sugar - stop pepsi     tried tricor and can't tolerate     can't tolerate fish oil but tolerate omega krill oil ( uncertain safety )     the only true safe fish oil is brand name ( lovaza or vascepa but couldn't handle )      address diet.                  Blood Pressure Management  on  losartan ,  dose is 25 mg daily      Cardiology started again mg ox and chlorK                 Lab Results   Component Value Date     GLUCOSE 173 (H) 10/30/2016     CALCIUM 9.2 10/30/2016      (L) 10/30/2016     K 3.7 10/30/2016     CO2 38 (H) 10/30/2016     CL 92 (L) 10/30/2016     BUN 12 10/30/2016     CREATININE 0.8 10/30/2016     ANIONGAP 6.0 10/30/2016         Microvascular Complication Monitoring:  Microalbuminuria, Date of last Microalbumin Assessment 02/11/2016, No Diabetic Retinopathy, Diabetic Neuropathy  Preventive Care:  Patient is smoking  states that will quit   Weight Related:  Obesity, Counseled on nutrition, Counseled on physical activity  Other Diabetes Related Aspects        Lab Results   Component Value Date     TSH 1.60 10/26/2016            Lab Results   Component Value Date     [id number] 05/03/2016                   4. Follow-up: Return in about 3 months (around 1/4/2018) for Recheck.

## 2017-10-10 ENCOUNTER — APPOINTMENT (OUTPATIENT)
Dept: PULMONOLOGY | Facility: HOSPITAL | Age: 62
End: 2017-10-10

## 2017-10-12 ENCOUNTER — APPOINTMENT (OUTPATIENT)
Dept: PULMONOLOGY | Facility: HOSPITAL | Age: 62
End: 2017-10-12

## 2017-10-17 ENCOUNTER — APPOINTMENT (OUTPATIENT)
Dept: PULMONOLOGY | Facility: HOSPITAL | Age: 62
End: 2017-10-17

## 2017-10-24 RX ORDER — LOSARTAN POTASSIUM 25 MG/1
TABLET ORAL
Qty: 90 TABLET | Refills: 0 | Status: SHIPPED | OUTPATIENT
Start: 2017-10-24

## 2017-10-24 RX ORDER — PRAVASTATIN SODIUM 40 MG
TABLET ORAL
Qty: 90 TABLET | Refills: 3 | Status: SHIPPED | OUTPATIENT
Start: 2017-10-24

## 2017-10-26 ENCOUNTER — APPOINTMENT (OUTPATIENT)
Dept: PULMONOLOGY | Facility: HOSPITAL | Age: 62
End: 2017-10-26

## 2017-10-31 ENCOUNTER — APPOINTMENT (OUTPATIENT)
Dept: PULMONOLOGY | Facility: HOSPITAL | Age: 62
End: 2017-10-31

## 2017-11-02 ENCOUNTER — APPOINTMENT (OUTPATIENT)
Dept: PULMONOLOGY | Facility: HOSPITAL | Age: 62
End: 2017-11-02

## 2017-11-07 ENCOUNTER — APPOINTMENT (OUTPATIENT)
Dept: PULMONOLOGY | Facility: HOSPITAL | Age: 62
End: 2017-11-07

## 2017-11-09 ENCOUNTER — APPOINTMENT (OUTPATIENT)
Dept: PULMONOLOGY | Facility: HOSPITAL | Age: 62
End: 2017-11-09

## 2017-11-14 ENCOUNTER — APPOINTMENT (OUTPATIENT)
Dept: PULMONOLOGY | Facility: HOSPITAL | Age: 62
End: 2017-11-14

## 2017-11-16 ENCOUNTER — APPOINTMENT (OUTPATIENT)
Dept: PULMONOLOGY | Facility: HOSPITAL | Age: 62
End: 2017-11-16

## 2017-11-21 ENCOUNTER — APPOINTMENT (OUTPATIENT)
Dept: PULMONOLOGY | Facility: HOSPITAL | Age: 62
End: 2017-11-21

## 2017-11-23 ENCOUNTER — APPOINTMENT (OUTPATIENT)
Dept: PULMONOLOGY | Facility: HOSPITAL | Age: 62
End: 2017-11-23

## 2017-11-28 ENCOUNTER — APPOINTMENT (OUTPATIENT)
Dept: PULMONOLOGY | Facility: HOSPITAL | Age: 62
End: 2017-11-28

## 2017-11-30 ENCOUNTER — APPOINTMENT (OUTPATIENT)
Dept: PULMONOLOGY | Facility: HOSPITAL | Age: 62
End: 2017-11-30

## 2017-12-05 ENCOUNTER — APPOINTMENT (OUTPATIENT)
Dept: PULMONOLOGY | Facility: HOSPITAL | Age: 62
End: 2017-12-05

## 2017-12-07 ENCOUNTER — APPOINTMENT (OUTPATIENT)
Dept: PULMONOLOGY | Facility: HOSPITAL | Age: 62
End: 2017-12-07

## 2017-12-12 ENCOUNTER — APPOINTMENT (OUTPATIENT)
Dept: PULMONOLOGY | Facility: HOSPITAL | Age: 62
End: 2017-12-12

## 2017-12-14 ENCOUNTER — APPOINTMENT (OUTPATIENT)
Dept: PULMONOLOGY | Facility: HOSPITAL | Age: 62
End: 2017-12-14

## 2017-12-19 ENCOUNTER — APPOINTMENT (OUTPATIENT)
Dept: PULMONOLOGY | Facility: HOSPITAL | Age: 62
End: 2017-12-19

## 2017-12-21 ENCOUNTER — APPOINTMENT (OUTPATIENT)
Dept: PULMONOLOGY | Facility: HOSPITAL | Age: 62
End: 2017-12-21

## 2017-12-26 ENCOUNTER — APPOINTMENT (OUTPATIENT)
Dept: PULMONOLOGY | Facility: HOSPITAL | Age: 62
End: 2017-12-26

## 2017-12-28 ENCOUNTER — APPOINTMENT (OUTPATIENT)
Dept: PULMONOLOGY | Facility: HOSPITAL | Age: 62
End: 2017-12-28

## 2018-01-02 ENCOUNTER — APPOINTMENT (OUTPATIENT)
Dept: PULMONOLOGY | Facility: HOSPITAL | Age: 63
End: 2018-01-02

## 2018-01-04 ENCOUNTER — APPOINTMENT (OUTPATIENT)
Dept: PULMONOLOGY | Facility: HOSPITAL | Age: 63
End: 2018-01-04

## 2018-01-09 ENCOUNTER — APPOINTMENT (OUTPATIENT)
Dept: PULMONOLOGY | Facility: HOSPITAL | Age: 63
End: 2018-01-09

## 2018-01-11 ENCOUNTER — APPOINTMENT (OUTPATIENT)
Dept: PULMONOLOGY | Facility: HOSPITAL | Age: 63
End: 2018-01-11

## 2018-01-16 ENCOUNTER — APPOINTMENT (OUTPATIENT)
Dept: PULMONOLOGY | Facility: HOSPITAL | Age: 63
End: 2018-01-16

## 2018-01-18 ENCOUNTER — APPOINTMENT (OUTPATIENT)
Dept: PULMONOLOGY | Facility: HOSPITAL | Age: 63
End: 2018-01-18

## 2018-01-23 ENCOUNTER — APPOINTMENT (OUTPATIENT)
Dept: PULMONOLOGY | Facility: HOSPITAL | Age: 63
End: 2018-01-23

## 2018-01-24 ENCOUNTER — OFFICE VISIT (OUTPATIENT)
Dept: ENDOCRINOLOGY | Facility: CLINIC | Age: 63
End: 2018-01-24

## 2018-01-24 VITALS
WEIGHT: 181 LBS | HEART RATE: 92 BPM | BODY MASS INDEX: 34.17 KG/M2 | HEIGHT: 61 IN | SYSTOLIC BLOOD PRESSURE: 126 MMHG | DIASTOLIC BLOOD PRESSURE: 68 MMHG

## 2018-01-24 DIAGNOSIS — I10 ESSENTIAL HYPERTENSION: ICD-10-CM

## 2018-01-24 DIAGNOSIS — Z79.4 TYPE 2 DIABETES MELLITUS WITH DIABETIC POLYNEUROPATHY, WITH LONG-TERM CURRENT USE OF INSULIN (HCC): Primary | ICD-10-CM

## 2018-01-24 DIAGNOSIS — E83.42 HYPOMAGNESEMIA: ICD-10-CM

## 2018-01-24 DIAGNOSIS — E78.5 HYPERLIPIDEMIA, UNSPECIFIED HYPERLIPIDEMIA TYPE: ICD-10-CM

## 2018-01-24 DIAGNOSIS — E11.42 DIABETIC POLYNEUROPATHY ASSOCIATED WITH TYPE 2 DIABETES MELLITUS (HCC): ICD-10-CM

## 2018-01-24 DIAGNOSIS — E11.42 TYPE 2 DIABETES MELLITUS WITH DIABETIC POLYNEUROPATHY, WITH LONG-TERM CURRENT USE OF INSULIN (HCC): Primary | ICD-10-CM

## 2018-01-24 PROCEDURE — 99214 OFFICE O/P EST MOD 30 MIN: CPT | Performed by: NURSE PRACTITIONER

## 2018-01-24 NOTE — PROGRESS NOTES
Subjective    Sulma Jacques is a 62 y.o. female. she is here today for follow-up.    History of Present Illness       Duration/Timing:  Diabetes mellitus type 2  duration, since 1998     timing, constant     quality, uncontrolled     -----------------------     Recent admission to hospital for CHF   Discharged one week ago   States blood sugar was controlled while in hospital because she could not drink regular pepsi  Severity (Complications/Hospitalizations)  Secondary Macrovascular Complications:  No CAD, No CVA, No PAD  Carotid Artery Disease  Secondary Microvascular Complications:  No Diabetic Nephropathy, No proteinuria, No Diabetic Retinopathy, Diabetic Neuropathy     Context  Diabetes Regimen:  Insulin, Oral Medications, Last HgbA1c% 7. 9 from May 2016           Lab Results   Component Value Date     HGBA1C 8.3 (H) 09/29/2017         Blood Glucose Readings          Refers not checking    Missing doses of insulin       Exercise:  Does not exercise     Associated Signs/Symptoms  Hyperglycemic Symptoms:  Polyuria, Polydipsia, Polyphagia  Hypoglycemic Episodes:  Documented symptomatic hypoglycemia  Hypertriglyceridemia     June 2015 Tg 647     Tot Chol 181     HDL 26         The following portions of the patient's history were reviewed and updated as appropriate:   Past Medical History:   Diagnosis Date   • Acute gastritis    • Anxiety    • Atherosclerosis     arteries of the extremities with intermittent claudication   • Carotid artery stenosis    • Chronic hypoxemic respiratory failure     On oxygen supplementation @2L/mi n q 24 hours   • Chronic low back pain    • COPD (chronic obstructive pulmonary disease)     Mod-Severe   • Depression    • Diabetes mellitus     Type 2   • Dyspnea    • Edema    • Encephalitis    • Epigastric pain    • Esophagitis    • Gallstone ileus     status post laparoscopic cholecystectomy   • GERD (gastroesophageal reflux disease)    • Hx of colonic polyp    • Hyperlipidemia    •  Hypertension    • Hypoxia     nocturnal and exertional on oxygen supplementation   • Insomnia    • Left upper quadrant pain    • Malaise and fatigue    • Migraine    • Neurogenic claudication    • Neuropathy    • Nicotine dependence    • Obstructive sleep apnea of adult     Mild RUBEN and moderate REM sleep associated RUBEN   • Postoperative visit     s/p L CEA 1/15/2015   • Primary fibromyalgia syndrome    • Restless legs    • Urinary system disease    • UTI (urinary tract infection)    • Vulvovaginitis      Past Surgical History:   Procedure Laterality Date   • CARDIAC CATHETERIZATION      Proximal circumflex artery towards the ostium with up to 60% stenosis diffusely calcified right coronary artery with 30-40% stenosis. Preserved LV systolic function with Ef of 55%   • CAROTID ENDARTERECTOMY      Left common and internal carotid endarterectomy with Hemashield patch angioplasty   •  SECTION      x2   • CHOLECYSTECTOMY      Laparoscopic cholecystectomy with operative cholangiogram. Gallstones   • HERNIA REPAIR      Multiple   • HYSTERECTOMY     • TRANSESOPHAGEAL ECHOCARDIOGRAM (AMANDA)      With color flow- Mild left atrial enlargemtn with mild CLVH. Evidence of mild septal wall hypokinesis. Decreased LV systolic function. Ef of 40-45%. MV intact. Tricuspid intact. Mild to moderate mitral regurg. mildtricuspid regurg, mild aortic insuff   • TUBAL ABDOMINAL LIGATION       Family History   Problem Relation Age of Onset   • Cancer Other    • Diabetes Other    • Thyroid disease Other      OB History     No data available        Current Outpatient Prescriptions   Medication Sig Dispense Refill   • albuterol (PROVENTIL HFA;VENTOLIN HFA) 108 (90 BASE) MCG/ACT inhaler Inhale 2 puffs every 4 (four) hours as needed for wheezing.     • albuterol (PROVENTIL) (2.5 MG/3ML) 0.083% nebulizer solution      • amitriptyline (ELAVIL) 25 MG tablet Take 25 mg by mouth every night.     • aspirin 325 MG tablet Take 325 mg by mouth every  night.     • B Complex-C-Folic Acid (NEPHROCAPS PO) Take 1 tablet by mouth daily.     • carvedilol (COREG) 6.25 MG tablet Take 3.125 mg by mouth 2 (two) times a day with meals.     • cetirizine (ZyrTEC) 10 MG tablet Take 10 mg by mouth daily.     • Cholecalciferol (VITAMIN D PO) Take 1 tablet by mouth daily.     • clopidogrel (PLAVIX) 75 MG tablet Take 75 mg by mouth Daily.     • Cranberry 450 MG tablet Take 1 tablet by mouth daily.     • dexlansoprazole (DEXILANT) 60 MG capsule Take 60 mg by mouth daily.     • docusate sodium (COLACE) 50 MG capsule Take 100 mg by mouth 2 (two) times a day.     • DULoxetine (CYMBALTA) 60 MG capsule Take 60 mg by mouth 2 (two) times a day.     • ferrous sulfate 325 (65 FE) MG tablet Take 325 mg by mouth 3 (three) times a day with meals.     • fluticasone (FLONASE) 50 MCG/ACT nasal spray 2 sprays into each nostril Daily.     • furosemide (LASIX) 40 MG tablet Take 40 mg by mouth 2 (two) times a day.     • Glucose Blood (BLOOD GLUCOSE TEST) strip Use 4 x daily, use any brand covered by insurance or same brand as before 360 each 3   • Insulin Glargine (TOUJEO SOLOSTAR) 300 UNIT/ML solution pen-injector Inject 75 Units under the skin Every Night. 15 pen 3   • Insulin Pen Needle (B-D UF III MINI PEN NEEDLES) 31G X 5 MM misc Use 4 times daily 360 each 3   • KRILL OIL OMEGA-3 PO Take 1 capsule by mouth daily.     • losartan (COZAAR) 25 MG tablet TAKE ONE TABLET BY MOUTH ONCE DAILY 90 tablet 0   • lysine acetate 500 MG tablet tablet Take 1 tablet by mouth every other day.     • magnesium oxide (MAGOX) 400 (241.3 Mg) MG tablet tablet Take 1 tablet by mouth 2 (Two) Times a Day. 60 tablet 11   • Multiple Vitamins-Minerals (MULTIVITAMIN ADULT PO) Take 1 tablet by mouth daily.     • niacin 500 MG tablet Take 500 mg by mouth 2 (two) times a day with meals. Wants caps     • nitroglycerin (NITROSTAT) 0.4 MG SL tablet      • NOVOLOG FLEXPEN 100 UNIT/ML solution pen-injector sc pen 25 units with meals  24 pen 3   • polycarbophil (FIBERCON) 625 MG tablet Take 1,250 mg by mouth daily.     • potassium chloride (K-DUR,KLOR-CON) 20 MEQ CR tablet Take 20 mEq by mouth Daily.     • pravastatin (PRAVACHOL) 40 MG tablet TAKE ONE TABLET BY MOUTH ONCE DAILY AT BEDTIME 90 tablet 3   • pregabalin (LYRICA) 150 MG capsule Take 150 mg by mouth 2 (two) times a day.     • rOPINIRole (REQUIP) 3 MG tablet Take 3 mg by mouth every night.     • spironolactone (ALDACTONE) 25 MG tablet Take 25 mg by mouth Daily.     • Umeclidinium-Vilanterol (ANORO ELLIPTA) 62.5-25 MCG/INH aerosol powder  Inhale 1 puff daily.       No current facility-administered medications for this visit.      Allergies   Allergen Reactions   • Codeine Other (See Comments)     N/V   • Crestor [Rosuvastatin Calcium] Other (See Comments)     Muscle weakness   • Demerol [Meperidine] Other (See Comments)     Dorado   • Fish Oil Other (See Comments)     N/V   • Lipitor [Atorvastatin] Other (See Comments)     Flu like symptoms   • Lovaza [Omega-3-Acid Ethyl Esters] Swelling   • Metformin And Related Other (See Comments)     N/V   • Nateglinide Other (See Comments)     N/V   • Other Other (See Comments)     Staples, skin irritation   • Simvastatin Other (See Comments)     Flu like symptoms   • Sulfa Antibiotics Other (See Comments)     N/V   • Tricor [Fenofibrate] Other (See Comments)     N/V   • Wellbutrin [Bupropion] Other (See Comments)     N/V   • Betadine [Povidone Iodine] Rash     Social History     Social History   • Marital status:      Spouse name: N/A   • Number of children: N/A   • Years of education: N/A     Social History Main Topics   • Smoking status: Current Every Day Smoker   • Smokeless tobacco: Never Used   • Alcohol use No   • Drug use: None   • Sexual activity: Not Asked     Other Topics Concern   • None     Social History Narrative       Review of Systems  Review of Systems   Constitutional: Negative for activity change, appetite change,  "diaphoresis and fatigue.   HENT: Negative for congestion, dental problem, facial swelling, sneezing, sore throat, tinnitus, trouble swallowing and voice change.    Eyes: Negative for photophobia, pain, discharge, redness, itching and visual disturbance.   Respiratory: Negative for apnea, cough, choking, chest tightness and shortness of breath.    Cardiovascular: Negative for chest pain, palpitations and leg swelling.   Gastrointestinal: Negative for abdominal distention, abdominal pain, constipation, diarrhea, nausea and vomiting.   Endocrine: Negative for cold intolerance, heat intolerance, polydipsia, polyphagia and polyuria.   Genitourinary: Negative for difficulty urinating, dysuria, frequency, hematuria and urgency.   Musculoskeletal: Negative for arthralgias, back pain, gait problem, joint swelling, myalgias, neck pain and neck stiffness.   Skin: Negative for color change, pallor, rash and wound.   Allergic/Immunologic: Negative for environmental allergies.   Neurological: Negative for dizziness, tremors, weakness, light-headedness, numbness and headaches.   Hematological: Negative for adenopathy. Does not bruise/bleed easily.   Psychiatric/Behavioral: Negative for agitation, behavioral problems, confusion and sleep disturbance.        Objective    /68 (BP Location: Left arm, Patient Position: Sitting, Cuff Size: Adult)  Pulse 92  Ht 154.9 cm (61\")  Wt 82.1 kg (181 lb)  BMI 34.2 kg/m2  Physical Exam   Constitutional: She is oriented to person, place, and time. She appears well-developed and well-nourished. No distress.   HENT:   Head: Normocephalic and atraumatic.   Right Ear: External ear normal.   Left Ear: External ear normal.   Nose: Nose normal.   Eyes: Conjunctivae and EOM are normal. Pupils are equal, round, and reactive to light.   Neck: Normal range of motion. Neck supple. No tracheal deviation present. No thyromegaly present.   Cardiovascular: Normal rate, regular rhythm and normal heart " sounds.    No murmur heard.  Pulmonary/Chest: Effort normal and breath sounds normal. No respiratory distress. She has no wheezes.   On oxygen   Abdominal: Soft. Bowel sounds are normal. There is no tenderness. There is no rebound and no guarding.   Musculoskeletal: Normal range of motion. She exhibits no edema, tenderness or deformity.   Neurological: She is alert and oriented to person, place, and time. No cranial nerve deficit.   Skin: Skin is warm and dry. No rash noted.   Psychiatric: She has a normal mood and affect. Her behavior is normal. Judgment and thought content normal.       Lab Review  Glucose   Date Value   09/29/2017 157 mg/dL (H)   07/03/2017 40 mg/dL (C)   10/30/2016 173 mg/dl (H)   10/29/2016 79 mg/dl   10/28/2016 146 mg/dl (H)     Sodium (mmol/L)   Date Value   09/29/2017 139   07/03/2017 138   10/30/2016 136 (L)   10/29/2016 138   10/28/2016 136 (L)     Potassium (mmol/L)   Date Value   09/29/2017 3.3 (L)   07/03/2017 3.2 (L)   10/30/2016 3.7   10/29/2016 3.4 (L)   10/28/2016 3.9     Chloride (mmol/L)   Date Value   09/29/2017 93 (L)   07/03/2017 102   10/30/2016 92 (L)   10/29/2016 95   10/28/2016 99     CO2 (mmol/L)   Date Value   09/29/2017 36.0 (H)   07/03/2017 23.0   10/30/2016 38 (H)   10/29/2016 38 (H)   10/28/2016 32 (H)     BUN   Date Value   09/29/2017 14 mg/dL   07/03/2017 14 mg/dL   10/30/2016 12 mg/dl   10/29/2016 10 mg/dl   10/28/2016 7 mg/dl     Creatinine   Date Value   09/29/2017 0.76 mg/dL   07/03/2017 0.80 mg/dL   10/30/2016 0.8 mg/dl   10/29/2016 0.8 mg/dl   10/28/2016 0.7 mg/dl     Hemoglobin A1C   Date Value   09/29/2017 8.3 % (H)   07/03/2017 8.39 % (H)   10/26/2016 7.2 %TotHgb (H)   05/03/2016 7.9 %TotHgb (H)   02/01/2016 7.3 %TotHgb (H)     Triglycerides   Date Value   09/29/2017 247 mg/dL (H)   07/03/2017 183 mg/dL   10/26/2016 143 mg/dl   05/03/2016 160 mg/dl   02/01/2016 400 mg/dl (H)       Assessment/Plan      1. Type 2 diabetes mellitus with diabetic  polyneuropathy, with long-term current use of insulin    2. Diabetic polyneuropathy associated with type 2 diabetes mellitus    3. Hyperlipidemia, unspecified hyperlipidemia type    4. Essential hypertension    5. Hypomagnesemia    .    Medications prescribed:  Outpatient Encounter Prescriptions as of 1/24/2018   Medication Sig Dispense Refill   • albuterol (PROVENTIL HFA;VENTOLIN HFA) 108 (90 BASE) MCG/ACT inhaler Inhale 2 puffs every 4 (four) hours as needed for wheezing.     • albuterol (PROVENTIL) (2.5 MG/3ML) 0.083% nebulizer solution      • amitriptyline (ELAVIL) 25 MG tablet Take 25 mg by mouth every night.     • aspirin 325 MG tablet Take 325 mg by mouth every night.     • B Complex-C-Folic Acid (NEPHROCAPS PO) Take 1 tablet by mouth daily.     • carvedilol (COREG) 6.25 MG tablet Take 3.125 mg by mouth 2 (two) times a day with meals.     • cetirizine (ZyrTEC) 10 MG tablet Take 10 mg by mouth daily.     • Cholecalciferol (VITAMIN D PO) Take 1 tablet by mouth daily.     • clopidogrel (PLAVIX) 75 MG tablet Take 75 mg by mouth Daily.     • Cranberry 450 MG tablet Take 1 tablet by mouth daily.     • dexlansoprazole (DEXILANT) 60 MG capsule Take 60 mg by mouth daily.     • docusate sodium (COLACE) 50 MG capsule Take 100 mg by mouth 2 (two) times a day.     • DULoxetine (CYMBALTA) 60 MG capsule Take 60 mg by mouth 2 (two) times a day.     • ferrous sulfate 325 (65 FE) MG tablet Take 325 mg by mouth 3 (three) times a day with meals.     • fluticasone (FLONASE) 50 MCG/ACT nasal spray 2 sprays into each nostril Daily.     • furosemide (LASIX) 40 MG tablet Take 40 mg by mouth 2 (two) times a day.     • Glucose Blood (BLOOD GLUCOSE TEST) strip Use 4 x daily, use any brand covered by insurance or same brand as before 360 each 3   • Insulin Glargine (TOUJEO SOLOSTAR) 300 UNIT/ML solution pen-injector Inject 75 Units under the skin Every Night. 15 pen 3   • Insulin Pen Needle (B-D UF III MINI PEN NEEDLES) 31G X 5 MM misc  Use 4 times daily 360 each 3   • KRILL OIL OMEGA-3 PO Take 1 capsule by mouth daily.     • losartan (COZAAR) 25 MG tablet TAKE ONE TABLET BY MOUTH ONCE DAILY 90 tablet 0   • lysine acetate 500 MG tablet tablet Take 1 tablet by mouth every other day.     • magnesium oxide (MAGOX) 400 (241.3 Mg) MG tablet tablet Take 1 tablet by mouth 2 (Two) Times a Day. 60 tablet 11   • Multiple Vitamins-Minerals (MULTIVITAMIN ADULT PO) Take 1 tablet by mouth daily.     • niacin 500 MG tablet Take 500 mg by mouth 2 (two) times a day with meals. Wants caps     • nitroglycerin (NITROSTAT) 0.4 MG SL tablet      • NOVOLOG FLEXPEN 100 UNIT/ML solution pen-injector sc pen 25 units with meals 24 pen 3   • polycarbophil (FIBERCON) 625 MG tablet Take 1,250 mg by mouth daily.     • potassium chloride (K-DUR,KLOR-CON) 20 MEQ CR tablet Take 20 mEq by mouth Daily.     • pravastatin (PRAVACHOL) 40 MG tablet TAKE ONE TABLET BY MOUTH ONCE DAILY AT BEDTIME 90 tablet 3   • pregabalin (LYRICA) 150 MG capsule Take 150 mg by mouth 2 (two) times a day.     • rOPINIRole (REQUIP) 3 MG tablet Take 3 mg by mouth every night.     • spironolactone (ALDACTONE) 25 MG tablet Take 25 mg by mouth Daily.     • Umeclidinium-Vilanterol (ANORO ELLIPTA) 62.5-25 MCG/INH aerosol powder  Inhale 1 puff daily.     • [DISCONTINUED] magnesium oxide (MAGOX) 400 (241.3 MG) MG tablet tablet Take 1 tablet by mouth 2 (two) times a day. 60 tablet 2     No facility-administered encounter medications on file as of 1/24/2018.        Orders placed during this encounter include:  Orders Placed This Encounter   Procedures   • Hemoglobin A1c   • Magnesium   • CBC & Differential     Order Specific Question:   Manual Differential     Answer:   No   Glycemic management            Lab Results   Component Value Date     HGBA1C 8.3 (H) 09/29/2017        please take insulin       toujeo 56 units --- 70 units --- decrease to 68 units      compare your bedtime reading to your morning reading     if  you go to bed between 80 to 150 , you should wake between 80 to 150      if dropping below 80 or dropping more than 50 points overnight - back off 5 units     if rising more than 50 points overnight - increase by 3 units every 3 days     =================     Januvia 100 mg daily, too expensive     =================     Novolog  1 units for every 15 grams of carbohydrate --increase to 2 units per 15 grams of CHO           Lipid Management              Total Cholesterol   Date Value Ref Range Status   09/29/2017 194 0 - 199 mg/dL Final      Triglycerides   Date Value Ref Range Status   09/29/2017 247 (H) 20 - 199 mg/dL Final            HDL Cholesterol   Date Value Ref Range Status   09/29/2017 31 (L) 60 - 200 mg/dL Final            LDL Cholesterol    Date Value Ref Range Status   09/29/2017 114 1 - 129 mg/dL Final               pravastatin at night-- not taking restart      niacin 500 mg , preceded by aspirin - not taking restart      high fructose corn syrup drinks raise triglycerides and sugar - stop pepsi     tried tricor and can't tolerate     can't tolerate fish oil but tolerate omega krill oil ( uncertain safety )     the only true safe fish oil is brand name ( lovaza or vascepa but couldn't handle )      address diet.                  Blood Pressure Management  on losartan ,  dose is 25 mg daily      Cardiology started again mg ox and chlorK     taking 20 mEq of potassium BID per cardiology                Lab Results   Component Value Date     GLUCOSE 173 (H) 10/30/2016     CALCIUM 9.2 10/30/2016      (L) 10/30/2016     K 3.7 10/30/2016     CO2 38 (H) 10/30/2016     CL 92 (L) 10/30/2016     BUN 12 10/30/2016     CREATININE 0.8 10/30/2016     ANIONGAP 6.0 10/30/2016         Microvascular Complication Monitoring:  Microalbuminuria, Date of last Microalbumin Assessment 02/11/2016, No Diabetic Retinopathy, Diabetic Neuropathy  Preventive Care:  Patient is smoking  states that will quit     I advised the  patient of the risks in continuing to use tobacco, and I provided this patient with smoking cessation educational materials.  I also discussed how to quit smoking and the patient has expressed the willingness to quit.      During this visit, I spent 3-10 mintues counseling the patient regarding smoking cessation.     Weight Related:  Obesity, Counseled on nutrition, Counseled on physical activity  Other Diabetes Related Aspects            Lab Results   Component Value Date     TSH 1.60 10/26/2016                Lab Results   Component Value Date     [id number] 05/03/2016          labs tomorrow         4. Follow-up: Return in about 4 months (around 5/24/2018) for Recheck.

## 2018-01-25 ENCOUNTER — APPOINTMENT (OUTPATIENT)
Dept: PULMONOLOGY | Facility: HOSPITAL | Age: 63
End: 2018-01-25

## 2018-01-30 ENCOUNTER — APPOINTMENT (OUTPATIENT)
Dept: PULMONOLOGY | Facility: HOSPITAL | Age: 63
End: 2018-01-30

## 2018-02-01 ENCOUNTER — APPOINTMENT (OUTPATIENT)
Dept: PULMONOLOGY | Facility: HOSPITAL | Age: 63
End: 2018-02-01

## 2018-05-23 ENCOUNTER — OFFICE VISIT (OUTPATIENT)
Dept: ENDOCRINOLOGY | Facility: CLINIC | Age: 63
End: 2018-05-23

## 2018-05-23 VITALS
DIASTOLIC BLOOD PRESSURE: 62 MMHG | BODY MASS INDEX: 34.55 KG/M2 | HEART RATE: 100 BPM | SYSTOLIC BLOOD PRESSURE: 128 MMHG | WEIGHT: 183 LBS | HEIGHT: 61 IN

## 2018-05-23 DIAGNOSIS — I10 ESSENTIAL HYPERTENSION: ICD-10-CM

## 2018-05-23 DIAGNOSIS — E78.5 HYPERLIPIDEMIA, UNSPECIFIED HYPERLIPIDEMIA TYPE: ICD-10-CM

## 2018-05-23 DIAGNOSIS — E55.9 VITAMIN D DEFICIENCY: ICD-10-CM

## 2018-05-23 DIAGNOSIS — Z79.4 TYPE 2 DIABETES MELLITUS WITH DIABETIC POLYNEUROPATHY, WITH LONG-TERM CURRENT USE OF INSULIN (HCC): Primary | ICD-10-CM

## 2018-05-23 DIAGNOSIS — E11.42 DIABETIC POLYNEUROPATHY ASSOCIATED WITH TYPE 2 DIABETES MELLITUS (HCC): ICD-10-CM

## 2018-05-23 DIAGNOSIS — E11.42 TYPE 2 DIABETES MELLITUS WITH DIABETIC POLYNEUROPATHY, WITH LONG-TERM CURRENT USE OF INSULIN (HCC): Primary | ICD-10-CM

## 2018-05-23 PROCEDURE — 99214 OFFICE O/P EST MOD 30 MIN: CPT | Performed by: NURSE PRACTITIONER

## 2018-05-23 NOTE — PROGRESS NOTES
Subjective    Sulma Jacques is a 62 y.o. female. she is here today for follow-up.    History of Present Illness       Duration/Timing:  Diabetes mellitus type 2  duration, since 1998     timing, constant     quality, uncontrolled     Patient has lung cancer and receiving chemotherapy with decadron       Severity (Complications/Hospitalizations)  Secondary Macrovascular Complications:  No CAD, No CVA, No PAD  Carotid Artery Disease  Secondary Microvascular Complications:  No Diabetic Nephropathy, No proteinuria, No Diabetic Retinopathy, Diabetic Neuropathy     Context  Diabetes Regimen:  Insulin, Oral Medications, Last HgbA1c% 7. 9 from May 2016               Lab Results   Component Value Date     HGBA1C 8.3 (H) 09/29/2017         Blood Glucose Readings           Refers not checking     Missing doses of insulin         Exercise:  Does not exercise     Associated Signs/Symptoms  Hyperglycemic Symptoms:  Polyuria, Polydipsia, Polyphagia  Hypoglycemic Episodes:  Documented symptomatic hypoglycemia  Hypertriglyceridemia     June 2015 Tg 647     Tot Chol 181     HDL 26             The following portions of the patient's history were reviewed and updated as appropriate:   Past Medical History:   Diagnosis Date   • Acute gastritis    • Anxiety    • Atherosclerosis     arteries of the extremities with intermittent claudication   • Carotid artery stenosis    • Chronic hypoxemic respiratory failure     On oxygen supplementation @2L/mi n q 24 hours   • Chronic low back pain    • COPD (chronic obstructive pulmonary disease)     Mod-Severe   • Depression    • Diabetes mellitus     Type 2   • Dyspnea    • Edema    • Encephalitis    • Epigastric pain    • Esophagitis    • Gallstone ileus     status post laparoscopic cholecystectomy   • GERD (gastroesophageal reflux disease)    • Hx of colonic polyp    • Hyperlipidemia    • Hypertension    • Hypoxia     nocturnal and exertional on oxygen supplementation   • Insomnia    • Left  upper quadrant pain    • Malaise and fatigue    • Migraine    • Neurogenic claudication    • Neuropathy    • Nicotine dependence    • Obstructive sleep apnea of adult     Mild RUBEN and moderate REM sleep associated RUBEN   • Postoperative visit     s/p L CEA 1/15/2015   • Primary fibromyalgia syndrome    • Restless legs    • Urinary system disease    • UTI (urinary tract infection)    • Vulvovaginitis      Past Surgical History:   Procedure Laterality Date   • CARDIAC CATHETERIZATION      Proximal circumflex artery towards the ostium with up to 60% stenosis diffusely calcified right coronary artery with 30-40% stenosis. Preserved LV systolic function with Ef of 55%   • CAROTID ENDARTERECTOMY      Left common and internal carotid endarterectomy with Hemashield patch angioplasty   •  SECTION      x2   • CHOLECYSTECTOMY      Laparoscopic cholecystectomy with operative cholangiogram. Gallstones   • HERNIA REPAIR      Multiple   • HYSTERECTOMY     • TRANSESOPHAGEAL ECHOCARDIOGRAM (AMANDA)      With color flow- Mild left atrial enlargemtn with mild CLVH. Evidence of mild septal wall hypokinesis. Decreased LV systolic function. Ef of 40-45%. MV intact. Tricuspid intact. Mild to moderate mitral regurg. mildtricuspid regurg, mild aortic insuff   • TUBAL ABDOMINAL LIGATION       Family History   Problem Relation Age of Onset   • Cancer Other    • Diabetes Other    • Thyroid disease Other      OB History     No data available        Current Outpatient Prescriptions   Medication Sig Dispense Refill   • albuterol (PROVENTIL HFA;VENTOLIN HFA) 108 (90 BASE) MCG/ACT inhaler Inhale 2 puffs every 4 (four) hours as needed for wheezing.     • albuterol (PROVENTIL) (2.5 MG/3ML) 0.083% nebulizer solution      • amitriptyline (ELAVIL) 25 MG tablet Take 25 mg by mouth every night.     • aspirin 325 MG tablet Take 325 mg by mouth every night.     • B Complex-C-Folic Acid (NEPHROCAPS PO) Take 1 tablet by mouth daily.     • carvedilol  (COREG) 6.25 MG tablet Take 3.125 mg by mouth 2 (two) times a day with meals.     • cetirizine (ZyrTEC) 10 MG tablet Take 10 mg by mouth daily.     • Cholecalciferol (VITAMIN D PO) Take 1 tablet by mouth daily.     • clopidogrel (PLAVIX) 75 MG tablet Take 75 mg by mouth Daily.     • Cranberry 450 MG tablet Take 1 tablet by mouth daily.     • dexlansoprazole (DEXILANT) 60 MG capsule Take 60 mg by mouth daily.     • docusate sodium (COLACE) 50 MG capsule Take 100 mg by mouth 2 (two) times a day.     • DULoxetine (CYMBALTA) 60 MG capsule Take 60 mg by mouth 2 (two) times a day.     • ferrous sulfate 325 (65 FE) MG tablet Take 325 mg by mouth 3 (three) times a day with meals.     • fluticasone (FLONASE) 50 MCG/ACT nasal spray 2 sprays into each nostril Daily.     • furosemide (LASIX) 40 MG tablet Take 40 mg by mouth 2 (two) times a day.     • Glucose Blood (BLOOD GLUCOSE TEST) strip Use 4 x daily, use any brand covered by insurance or same brand as before 360 each 3   • Insulin Glargine (TOUJEO SOLOSTAR) 300 UNIT/ML solution pen-injector Inject 75 Units under the skin Every Night. 15 pen 3   • Insulin Pen Needle (B-D UF III MINI PEN NEEDLES) 31G X 5 MM misc Use 4 times daily 360 each 3   • KRILL OIL OMEGA-3 PO Take 1 capsule by mouth daily.     • losartan (COZAAR) 25 MG tablet TAKE ONE TABLET BY MOUTH ONCE DAILY 90 tablet 0   • lysine acetate 500 MG tablet tablet Take 1 tablet by mouth every other day.     • magnesium oxide (MAGOX) 400 (241.3 Mg) MG tablet tablet Take 1 tablet by mouth 2 (Two) Times a Day. 60 tablet 11   • Multiple Vitamins-Minerals (MULTIVITAMIN ADULT PO) Take 1 tablet by mouth daily.     • niacin 500 MG tablet Take 500 mg by mouth 2 (two) times a day with meals. Wants caps     • nitroglycerin (NITROSTAT) 0.4 MG SL tablet      • NOVOLOG FLEXPEN 100 UNIT/ML solution pen-injector sc pen 25 units with meals 24 pen 3   • polycarbophil (FIBERCON) 625 MG tablet Take 1,250 mg by mouth daily.     • potassium  chloride (K-DUR,KLOR-CON) 20 MEQ CR tablet Take 20 mEq by mouth Daily.     • pravastatin (PRAVACHOL) 40 MG tablet TAKE ONE TABLET BY MOUTH ONCE DAILY AT BEDTIME 90 tablet 3   • pregabalin (LYRICA) 150 MG capsule Take 150 mg by mouth 2 (two) times a day.     • rOPINIRole (REQUIP) 3 MG tablet Take 3 mg by mouth every night.     • spironolactone (ALDACTONE) 25 MG tablet Take 25 mg by mouth Daily.     • Umeclidinium-Vilanterol (ANORO ELLIPTA) 62.5-25 MCG/INH aerosol powder  Inhale 1 puff daily.       No current facility-administered medications for this visit.      Allergies   Allergen Reactions   • Codeine Other (See Comments)     N/V   • Crestor [Rosuvastatin Calcium] Other (See Comments)     Muscle weakness   • Demerol [Meperidine] Other (See Comments)     Dorado   • Fish Oil Other (See Comments)     N/V   • Lipitor [Atorvastatin] Other (See Comments)     Flu like symptoms   • Lovaza [Omega-3-Acid Ethyl Esters] Swelling   • Metformin And Related Other (See Comments)     N/V   • Nateglinide Other (See Comments)     N/V   • Other Other (See Comments)     Staples, skin irritation   • Simvastatin Other (See Comments)     Flu like symptoms   • Sulfa Antibiotics Other (See Comments)     N/V   • Tricor [Fenofibrate] Other (See Comments)     N/V   • Wellbutrin [Bupropion] Other (See Comments)     N/V   • Betadine [Povidone Iodine] Rash     Social History     Social History   • Marital status:      Social History Main Topics   • Smoking status: Current Every Day Smoker   • Smokeless tobacco: Never Used   • Alcohol use No   • Drug use: Unknown     Other Topics Concern   • Not on file       Review of Systems  Review of Systems   Constitutional: Negative for activity change, appetite change, diaphoresis and fatigue.   HENT: Negative for facial swelling, sneezing, sore throat, tinnitus, trouble swallowing and voice change.    Eyes: Negative for photophobia, pain, discharge, redness, itching and visual disturbance.  "  Respiratory: Negative for apnea, cough, choking, chest tightness and shortness of breath.    Cardiovascular: Negative for chest pain, palpitations and leg swelling.   Gastrointestinal: Negative for abdominal distention, abdominal pain, constipation, diarrhea, nausea and vomiting.   Endocrine: Negative for cold intolerance, heat intolerance, polydipsia, polyphagia and polyuria.   Genitourinary: Negative for difficulty urinating, dysuria, frequency, hematuria and urgency.   Musculoskeletal: Negative for arthralgias, back pain, gait problem, joint swelling, myalgias, neck pain and neck stiffness.   Skin: Negative for color change, pallor, rash and wound.   Neurological: Negative for dizziness, tremors, weakness, light-headedness, numbness and headaches.   Hematological: Negative for adenopathy. Does not bruise/bleed easily.   Psychiatric/Behavioral: Negative for behavioral problems, confusion and sleep disturbance.        Objective    /62 (BP Location: Left arm, Patient Position: Sitting, Cuff Size: Adult)   Pulse 100   Ht 154.9 cm (61\")   Wt 83 kg (183 lb)   BMI 34.58 kg/m²   Physical Exam   Constitutional: She is oriented to person, place, and time. She appears well-developed and well-nourished. No distress.   HENT:   Head: Normocephalic and atraumatic.   Right Ear: External ear normal.   Left Ear: External ear normal.   Nose: Nose normal.   Eyes: Conjunctivae and EOM are normal. Pupils are equal, round, and reactive to light.   Neck: Normal range of motion. Neck supple. No tracheal deviation present. No thyromegaly present.   Cardiovascular: Normal rate, regular rhythm and normal heart sounds.    No murmur heard.  Pulmonary/Chest: Effort normal and breath sounds normal. No respiratory distress. She has no wheezes.   Abdominal: Soft. Bowel sounds are normal. There is no tenderness. There is no rebound and no guarding.   Musculoskeletal: Normal range of motion. She exhibits no edema, tenderness or " deformity.   Neurological: She is alert and oriented to person, place, and time. No cranial nerve deficit.   Skin: Skin is warm and dry. No rash noted.   Psychiatric: She has a normal mood and affect. Her behavior is normal. Judgment and thought content normal.       Lab Review  Glucose   Date Value   09/29/2017 157 mg/dL (H)   07/03/2017 40 mg/dL (C)   10/30/2016 173 mg/dl (H)   10/29/2016 79 mg/dl   10/28/2016 146 mg/dl (H)     Sodium (mmol/L)   Date Value   09/29/2017 139   07/03/2017 138   10/30/2016 136 (L)   10/29/2016 138   10/28/2016 136 (L)     Potassium (mmol/L)   Date Value   09/29/2017 3.3 (L)   07/03/2017 3.2 (L)   10/30/2016 3.7   10/29/2016 3.4 (L)   10/28/2016 3.9     Chloride (mmol/L)   Date Value   09/29/2017 93 (L)   07/03/2017 102   10/30/2016 92 (L)   10/29/2016 95   10/28/2016 99     CO2 (mmol/L)   Date Value   09/29/2017 36.0 (H)   07/03/2017 23.0   10/30/2016 38 (H)   10/29/2016 38 (H)   10/28/2016 32 (H)     BUN   Date Value   09/29/2017 14 mg/dL   07/03/2017 14 mg/dL   10/30/2016 12 mg/dl   10/29/2016 10 mg/dl   10/28/2016 7 mg/dl     Creatinine   Date Value   09/29/2017 0.76 mg/dL   07/03/2017 0.80 mg/dL   10/30/2016 0.8 mg/dl   10/29/2016 0.8 mg/dl   10/28/2016 0.7 mg/dl     Hemoglobin A1C   Date Value   09/29/2017 8.3 % (H)   07/03/2017 8.39 % (H)   10/26/2016 7.2 %TotHgb (H)   05/03/2016 7.9 %TotHgb (H)   02/01/2016 7.3 %TotHgb (H)     Triglycerides   Date Value   09/29/2017 247 mg/dL (H)   07/03/2017 183 mg/dL   10/26/2016 143 mg/dl   05/03/2016 160 mg/dl   02/01/2016 400 mg/dl (H)     LDL Cholesterol    Date Value   09/29/2017 114 mg/dL   07/03/2017 112 mg/dL   10/26/2016 110 mg/dl   05/03/2016 94 mg/dl       Assessment/Plan      1. Type 2 diabetes mellitus with diabetic polyneuropathy, with long-term current use of insulin    2. Hyperlipidemia, unspecified hyperlipidemia type    3. Essential hypertension    4. Diabetic polyneuropathy associated with type 2 diabetes mellitus    5.  Vitamin D deficiency    .    Medications prescribed:  Outpatient Encounter Prescriptions as of 5/23/2018   Medication Sig Dispense Refill   • albuterol (PROVENTIL HFA;VENTOLIN HFA) 108 (90 BASE) MCG/ACT inhaler Inhale 2 puffs every 4 (four) hours as needed for wheezing.     • albuterol (PROVENTIL) (2.5 MG/3ML) 0.083% nebulizer solution      • amitriptyline (ELAVIL) 25 MG tablet Take 25 mg by mouth every night.     • aspirin 325 MG tablet Take 325 mg by mouth every night.     • B Complex-C-Folic Acid (NEPHROCAPS PO) Take 1 tablet by mouth daily.     • carvedilol (COREG) 6.25 MG tablet Take 3.125 mg by mouth 2 (two) times a day with meals.     • cetirizine (ZyrTEC) 10 MG tablet Take 10 mg by mouth daily.     • Cholecalciferol (VITAMIN D PO) Take 1 tablet by mouth daily.     • clopidogrel (PLAVIX) 75 MG tablet Take 75 mg by mouth Daily.     • Cranberry 450 MG tablet Take 1 tablet by mouth daily.     • dexlansoprazole (DEXILANT) 60 MG capsule Take 60 mg by mouth daily.     • docusate sodium (COLACE) 50 MG capsule Take 100 mg by mouth 2 (two) times a day.     • DULoxetine (CYMBALTA) 60 MG capsule Take 60 mg by mouth 2 (two) times a day.     • ferrous sulfate 325 (65 FE) MG tablet Take 325 mg by mouth 3 (three) times a day with meals.     • fluticasone (FLONASE) 50 MCG/ACT nasal spray 2 sprays into each nostril Daily.     • furosemide (LASIX) 40 MG tablet Take 40 mg by mouth 2 (two) times a day.     • Glucose Blood (BLOOD GLUCOSE TEST) strip Use 4 x daily, use any brand covered by insurance or same brand as before 360 each 3   • Insulin Glargine (TOUJEO SOLOSTAR) 300 UNIT/ML solution pen-injector Inject 75 Units under the skin Every Night. 15 pen 3   • Insulin Pen Needle (B-D UF III MINI PEN NEEDLES) 31G X 5 MM misc Use 4 times daily 360 each 3   • KRILL OIL OMEGA-3 PO Take 1 capsule by mouth daily.     • losartan (COZAAR) 25 MG tablet TAKE ONE TABLET BY MOUTH ONCE DAILY 90 tablet 0   • lysine acetate 500 MG tablet tablet  Take 1 tablet by mouth every other day.     • magnesium oxide (MAGOX) 400 (241.3 Mg) MG tablet tablet Take 1 tablet by mouth 2 (Two) Times a Day. 60 tablet 11   • Multiple Vitamins-Minerals (MULTIVITAMIN ADULT PO) Take 1 tablet by mouth daily.     • niacin 500 MG tablet Take 500 mg by mouth 2 (two) times a day with meals. Wants caps     • nitroglycerin (NITROSTAT) 0.4 MG SL tablet      • NOVOLOG FLEXPEN 100 UNIT/ML solution pen-injector sc pen 25 units with meals 24 pen 3   • polycarbophil (FIBERCON) 625 MG tablet Take 1,250 mg by mouth daily.     • potassium chloride (K-DUR,KLOR-CON) 20 MEQ CR tablet Take 20 mEq by mouth Daily.     • pravastatin (PRAVACHOL) 40 MG tablet TAKE ONE TABLET BY MOUTH ONCE DAILY AT BEDTIME 90 tablet 3   • pregabalin (LYRICA) 150 MG capsule Take 150 mg by mouth 2 (two) times a day.     • rOPINIRole (REQUIP) 3 MG tablet Take 3 mg by mouth every night.     • spironolactone (ALDACTONE) 25 MG tablet Take 25 mg by mouth Daily.     • Umeclidinium-Vilanterol (ANORO ELLIPTA) 62.5-25 MCG/INH aerosol powder  Inhale 1 puff daily.       No facility-administered encounter medications on file as of 5/23/2018.        Orders placed during this encounter include:  Orders Placed This Encounter   Procedures   • Comprehensive Metabolic Panel     Standing Status:   Future     Standing Expiration Date:   5/23/2019   • Hemoglobin A1c     Standing Status:   Future     Standing Expiration Date:   5/23/2019   • Lipid Panel     Standing Status:   Future     Standing Expiration Date:   5/23/2019   • Vitamin D 25 Hydroxy     Standing Status:   Future     Standing Expiration Date:   5/23/2019   • Vitamin B12     Standing Status:   Future     Standing Expiration Date:   5/23/2019   • TSH     Standing Status:   Future     Standing Expiration Date:   5/23/2019   • Protein / Creatinine Ratio, Urine - Urine, Clean Catch     Standing Status:   Future     Standing Expiration Date:   5/23/2019   • Microalbumin / Creatinine  Urine Ratio - Urine, Clean Catch     Standing Status:   Future     Standing Expiration Date:   5/23/2019   • CBC & Differential     Standing Status:   Future     Standing Expiration Date:   5/23/2019     Order Specific Question:   Manual Differential     Answer:   No     Glycemic management                Lab Results   Component Value Date     HGBA1C 8.3 (H) 09/29/2017        please take insulin         toujeo 56 units --- 70 units      compare your bedtime reading to your morning reading     if you go to bed between 80 to 150 , you should wake between 80 to 150      if dropping below 80 or dropping more than 50 points overnight - back off 5 units     if rising more than 50 points overnight - increase by 3 units every 3 days     =================     Januvia 100 mg daily, too expensive     =================     Novolog           2 units per 15 grams of CHO            Now on chemotherapy with decadron     Will need to double novolog on steroid days and up to 3 days after            Lipid Management                  Total Cholesterol   Date Value Ref Range Status   09/29/2017 194 0 - 199 mg/dL Final            Triglycerides   Date Value Ref Range Status   09/29/2017 247 (H) 20 - 199 mg/dL Final                HDL Cholesterol   Date Value Ref Range Status   09/29/2017 31 (L) 60 - 200 mg/dL Final                LDL Cholesterol    Date Value Ref Range Status   09/29/2017 114 1 - 129 mg/dL Final               pravastatin at night-- not taking restart      niacin 500 mg , preceded by aspirin - not taking restart      high fructose corn syrup drinks raise triglycerides and sugar - stop pepsi     tried tricor and can't tolerate     can't tolerate fish oil but tolerate omega krill oil ( uncertain safety )     the only true safe fish oil is brand name ( lovaza or vascepa but couldn't handle )      address diet.                  Blood Pressure Management  on losartan ,  dose is 25 mg daily      Cardiology started again mg ox  and chlorK     taking 20 mEq of potassium BID per cardiology                Lab Results   Component Value Date     GLUCOSE 173 (H) 10/30/2016     CALCIUM 9.2 10/30/2016      (L) 10/30/2016     K 3.7 10/30/2016     CO2 38 (H) 10/30/2016     CL 92 (L) 10/30/2016     BUN 12 10/30/2016     CREATININE 0.8 10/30/2016     ANIONGAP 6.0 10/30/2016         Microvascular Complication Monitoring:  Microalbuminuria, Date of last Microalbumin Assessment 02/11/2016, No Diabetic Retinopathy, Diabetic Neuropathy          Preventive Care:  Patient is smoking    I advised the patient of the risks in continuing to use tobacco, and I provided this patient with smoking cessation educational materials.    During this visit, I spent less than 3 minutes counseling the patient regarding smoking cessation.     handout given smoking      Weight Related:  Obesity, Counseled on nutrition, Counseled on physical activity      Other Diabetes Related Aspects            Lab Results   Component Value Date     TSH 1.60 10/26/2016                Lab Results   Component Value Date     [id number] 05/03/2016         Labs this week and I will call    4. Follow-up: Return in about 3 months (around 8/23/2018) for Recheck.

## 2018-05-23 NOTE — PATIENT INSTRUCTIONS
Steps to Quit Smoking  Smoking tobacco can be harmful to your health and can affect almost every organ in your body. Smoking puts you, and those around you, at risk for developing many serious chronic diseases. Quitting smoking is difficult, but it is one of the best things that you can do for your health. It is never too late to quit.  What are the benefits of quitting smoking?  When you quit smoking, you lower your risk of developing serious diseases and conditions, such as:  · Lung cancer or lung disease, such as COPD.  · Heart disease.  · Stroke.  · Heart attack.  · Infertility.  · Osteoporosis and bone fractures.  Additionally, symptoms such as coughing, wheezing, and shortness of breath may get better when you quit. You may also find that you get sick less often because your body is stronger at fighting off colds and infections. If you are pregnant, quitting smoking can help to reduce your chances of having a baby of low birth weight.  How do I get ready to quit?  When you decide to quit smoking, create a plan to make sure that you are successful. Before you quit:  · Pick a date to quit. Set a date within the next two weeks to give you time to prepare.  · Write down the reasons why you are quitting. Keep this list in places where you will see it often, such as on your bathroom mirror or in your car or wallet.  · Identify the people, places, things, and activities that make you want to smoke (triggers) and avoid them. Make sure to take these actions:  ¨ Throw away all cigarettes at home, at work, and in your car.  ¨ Throw away smoking accessories, such as ashtrays and lighters.  ¨ Clean your car and make sure to empty the ashtray.  ¨ Clean your home, including curtains and carpets.  · Tell your family, friends, and coworkers that you are quitting. Support from your loved ones can make quitting easier.  · Talk with your health care provider about your options for quitting smoking.  · Find out what treatment  options are covered by your health insurance.  What strategies can I use to quit smoking?  Talk with your healthcare provider about different strategies to quit smoking. Some strategies include:  · Quitting smoking altogether instead of gradually lessening how much you smoke over a period of time. Research shows that quitting “cold turkey” is more successful than gradually quitting.  · Attending in-person counseling to help you build problem-solving skills. You are more likely to have success in quitting if you attend several counseling sessions. Even short sessions of 10 minutes can be effective.  · Finding resources and support systems that can help you to quit smoking and remain smoke-free after you quit. These resources are most helpful when you use them often. They can include:  ¨ Online chats with a counselor.  ¨ Telephone quitlines.  ¨ Printed self-help materials.  ¨ Support groups or group counseling.  ¨ Text messaging programs.  ¨ Mobile phone applications.  · Taking medicines to help you quit smoking. (If you are pregnant or breastfeeding, talk with your health care provider first.) Some medicines contain nicotine and some do not. Both types of medicines help with cravings, but the medicines that include nicotine help to relieve withdrawal symptoms. Your health care provider may recommend:  ¨ Nicotine patches, gum, or lozenges.  ¨ Nicotine inhalers or sprays.  ¨ Non-nicotine medicine that is taken by mouth.  Talk with your health care provider about combining strategies, such as taking medicines while you are also receiving in-person counseling. Using these two strategies together makes you more likely to succeed in quitting than if you used either strategy on its own.  If you are pregnant or breastfeeding, talk with your health care provider about finding counseling or other support strategies to quit smoking. Do not take medicine to help you quit smoking unless told to do so by your health care  provider.  What things can I do to make it easier to quit?  Quitting smoking might feel overwhelming at first, but there is a lot that you can do to make it easier. Take these important actions:  · Reach out to your family and friends and ask that they support and encourage you during this time. Call telephone quitlines, reach out to support groups, or work with a counselor for support.  · Ask people who smoke to avoid smoking around you.  · Avoid places that trigger you to smoke, such as bars, parties, or smoke-break areas at work.  · Spend time around people who do not smoke.  · Lessen stress in your life, because stress can be a smoking trigger for some people. To lessen stress, try:  ¨ Exercising regularly.  ¨ Deep-breathing exercises.  ¨ Yoga.  ¨ Meditating.  ¨ Performing a body scan. This involves closing your eyes, scanning your body from head to toe, and noticing which parts of your body are particularly tense. Purposefully relax the muscles in those areas.  · Download or purchase mobile phone or tablet apps (applications) that can help you stick to your quit plan by providing reminders, tips, and encouragement. There are many free apps, such as QuitGuide from the CDC (Centers for Disease Control and Prevention). You can find other support for quitting smoking (smoking cessation) through smokefree.gov and other websites.  How will I feel when I quit smoking?  Within the first 24 hours of quitting smoking, you may start to feel some withdrawal symptoms. These symptoms are usually most noticeable 2-3 days after quitting, but they usually do not last beyond 2-3 weeks. Changes or symptoms that you might experience include:  · Mood swings.  · Restlessness, anxiety, or irritation.  · Difficulty concentrating.  · Dizziness.  · Strong cravings for sugary foods in addition to nicotine.  · Mild weight gain.  · Constipation.  · Nausea.  · Coughing or a sore throat.  · Changes in how your medicines work in your  body.  · A depressed mood.  · Difficulty sleeping (insomnia).  After the first 2-3 weeks of quitting, you may start to notice more positive results, such as:  · Improved sense of smell and taste.  · Decreased coughing and sore throat.  · Slower heart rate.  · Lower blood pressure.  · Clearer skin.  · The ability to breathe more easily.  · Fewer sick days.  Quitting smoking is very challenging for most people. Do not get discouraged if you are not successful the first time. Some people need to make many attempts to quit before they achieve long-term success. Do your best to stick to your quit plan, and talk with your health care provider if you have any questions or concerns.  This information is not intended to replace advice given to you by your health care provider. Make sure you discuss any questions you have with your health care provider.  Document Released: 12/12/2002 Document Revised: 08/15/2017 Document Reviewed: 05/03/2016  [employer] Patient Education © 2017 [employer]

## 2018-06-15 ENCOUNTER — TELEPHONE (OUTPATIENT)
Dept: ENDOCRINOLOGY | Facility: CLINIC | Age: 63
End: 2018-06-15